# Patient Record
Sex: MALE | Race: BLACK OR AFRICAN AMERICAN | Employment: FULL TIME | URBAN - METROPOLITAN AREA
[De-identification: names, ages, dates, MRNs, and addresses within clinical notes are randomized per-mention and may not be internally consistent; named-entity substitution may affect disease eponyms.]

---

## 2021-09-26 ENCOUNTER — HOSPITAL ENCOUNTER (EMERGENCY)
Age: 41
Discharge: HOME | End: 2021-09-26
Attending: EMERGENCY MEDICINE

## 2021-09-26 ENCOUNTER — APPOINTMENT (OUTPATIENT)
Dept: CT IMAGING | Age: 41
End: 2021-09-26

## 2021-09-26 ENCOUNTER — APPOINTMENT (OUTPATIENT)
Dept: GENERAL RADIOLOGY | Age: 41
End: 2021-09-26

## 2021-09-26 VITALS
DIASTOLIC BLOOD PRESSURE: 64 MMHG | TEMPERATURE: 98.1 F | HEIGHT: 63 IN | SYSTOLIC BLOOD PRESSURE: 122 MMHG | OXYGEN SATURATION: 93 % | HEART RATE: 72 BPM | RESPIRATION RATE: 19 BRPM | WEIGHT: 123 LBS | BODY MASS INDEX: 21.79 KG/M2

## 2021-09-26 DIAGNOSIS — S01.01XA LACERATION OF SCALP, INITIAL ENCOUNTER: Primary | ICD-10-CM

## 2021-09-26 LAB
ABSOLUTE EOS #: 0.22 K/UL (ref 0–0.44)
ABSOLUTE IMMATURE GRANULOCYTE: <0.03 K/UL (ref 0–0.3)
ABSOLUTE LYMPH #: 1.68 K/UL (ref 1.1–3.7)
ABSOLUTE MONO #: 0.47 K/UL (ref 0.1–1.2)
ANION GAP SERPL CALCULATED.3IONS-SCNC: 16 MMOL/L (ref 9–17)
BASOPHILS # BLD: 1 % (ref 0–2)
BASOPHILS ABSOLUTE: 0.07 K/UL (ref 0–0.2)
BUN BLDV-MCNC: 6 MG/DL (ref 6–20)
BUN/CREAT BLD: ABNORMAL (ref 9–20)
CALCIUM SERPL-MCNC: 8.7 MG/DL (ref 8.6–10.4)
CHLORIDE BLD-SCNC: 94 MMOL/L (ref 98–107)
CO2: 25 MMOL/L (ref 20–31)
CREAT SERPL-MCNC: 0.47 MG/DL (ref 0.7–1.2)
DIFFERENTIAL TYPE: ABNORMAL
EOSINOPHILS RELATIVE PERCENT: 4 % (ref 1–4)
GFR AFRICAN AMERICAN: >60 ML/MIN
GFR NON-AFRICAN AMERICAN: >60 ML/MIN
GFR SERPL CREATININE-BSD FRML MDRD: ABNORMAL ML/MIN/{1.73_M2}
GFR SERPL CREATININE-BSD FRML MDRD: ABNORMAL ML/MIN/{1.73_M2}
GLUCOSE BLD-MCNC: 115 MG/DL (ref 70–99)
HCT VFR BLD CALC: 39.1 % (ref 40.7–50.3)
HEMOGLOBIN: 14.5 G/DL (ref 13–17)
IMMATURE GRANULOCYTES: 0 %
LYMPHOCYTES # BLD: 34 % (ref 24–43)
MCH RBC QN AUTO: 34.4 PG (ref 25.2–33.5)
MCHC RBC AUTO-ENTMCNC: 37.1 G/DL (ref 28.4–34.8)
MCV RBC AUTO: 92.9 FL (ref 82.6–102.9)
MONOCYTES # BLD: 10 % (ref 3–12)
MYOGLOBIN: 27 NG/ML (ref 28–72)
NRBC AUTOMATED: 0 PER 100 WBC
PDW BLD-RTO: 14.1 % (ref 11.8–14.4)
PLATELET # BLD: 90 K/UL (ref 138–453)
PLATELET ESTIMATE: ABNORMAL
PMV BLD AUTO: 9.6 FL (ref 8.1–13.5)
POTASSIUM SERPL-SCNC: 3.9 MMOL/L (ref 3.7–5.3)
RBC # BLD: 4.21 M/UL (ref 4.21–5.77)
RBC # BLD: ABNORMAL 10*6/UL
SEG NEUTROPHILS: 51 % (ref 36–65)
SEGMENTED NEUTROPHILS ABSOLUTE COUNT: 2.5 K/UL (ref 1.5–8.1)
SODIUM BLD-SCNC: 135 MMOL/L (ref 135–144)
TOTAL CK: 395 U/L (ref 39–308)
WBC # BLD: 5 K/UL (ref 3.5–11.3)
WBC # BLD: ABNORMAL 10*3/UL

## 2021-09-26 PROCEDURE — 83874 ASSAY OF MYOGLOBIN: CPT

## 2021-09-26 PROCEDURE — 93005 ELECTROCARDIOGRAM TRACING: CPT | Performed by: STUDENT IN AN ORGANIZED HEALTH CARE EDUCATION/TRAINING PROGRAM

## 2021-09-26 PROCEDURE — 96361 HYDRATE IV INFUSION ADD-ON: CPT

## 2021-09-26 PROCEDURE — 80048 BASIC METABOLIC PNL TOTAL CA: CPT

## 2021-09-26 PROCEDURE — 71046 X-RAY EXAM CHEST 2 VIEWS: CPT

## 2021-09-26 PROCEDURE — 6360000002 HC RX W HCPCS: Performed by: STUDENT IN AN ORGANIZED HEALTH CARE EDUCATION/TRAINING PROGRAM

## 2021-09-26 PROCEDURE — 6370000000 HC RX 637 (ALT 250 FOR IP): Performed by: STUDENT IN AN ORGANIZED HEALTH CARE EDUCATION/TRAINING PROGRAM

## 2021-09-26 PROCEDURE — 85025 COMPLETE CBC W/AUTO DIFF WBC: CPT

## 2021-09-26 PROCEDURE — 99284 EMERGENCY DEPT VISIT MOD MDM: CPT

## 2021-09-26 PROCEDURE — 96365 THER/PROPH/DIAG IV INF INIT: CPT

## 2021-09-26 PROCEDURE — 2580000003 HC RX 258: Performed by: STUDENT IN AN ORGANIZED HEALTH CARE EDUCATION/TRAINING PROGRAM

## 2021-09-26 PROCEDURE — 82550 ASSAY OF CK (CPK): CPT

## 2021-09-26 PROCEDURE — 70450 CT HEAD/BRAIN W/O DYE: CPT

## 2021-09-26 PROCEDURE — 12002 RPR S/N/AX/GEN/TRNK2.6-7.5CM: CPT

## 2021-09-26 PROCEDURE — 2500000003 HC RX 250 WO HCPCS: Performed by: STUDENT IN AN ORGANIZED HEALTH CARE EDUCATION/TRAINING PROGRAM

## 2021-09-26 RX ORDER — SODIUM CHLORIDE, SODIUM LACTATE, POTASSIUM CHLORIDE, CALCIUM CHLORIDE 600; 310; 30; 20 MG/100ML; MG/100ML; MG/100ML; MG/100ML
1000 INJECTION, SOLUTION INTRAVENOUS ONCE
Status: COMPLETED | OUTPATIENT
Start: 2021-09-26 | End: 2021-09-26

## 2021-09-26 RX ORDER — ACETAMINOPHEN 500 MG
1000 TABLET ORAL ONCE
Status: COMPLETED | OUTPATIENT
Start: 2021-09-26 | End: 2021-09-26

## 2021-09-26 RX ORDER — ALBUTEROL SULFATE 90 UG/1
2 AEROSOL, METERED RESPIRATORY (INHALATION) EVERY 4 HOURS PRN
Qty: 54 G | Refills: 0 | Status: SHIPPED | OUTPATIENT
Start: 2021-09-26

## 2021-09-26 RX ORDER — ACETAMINOPHEN 500 MG
1000 TABLET ORAL EVERY 6 HOURS PRN
Qty: 60 TABLET | Refills: 0 | Status: SHIPPED | OUTPATIENT
Start: 2021-09-26

## 2021-09-26 RX ADMIN — THIAMINE HYDROCHLORIDE: 100 INJECTION, SOLUTION INTRAMUSCULAR; INTRAVENOUS at 13:46

## 2021-09-26 RX ADMIN — SODIUM CHLORIDE, POTASSIUM CHLORIDE, SODIUM LACTATE AND CALCIUM CHLORIDE 1000 ML: 600; 310; 30; 20 INJECTION, SOLUTION INTRAVENOUS at 12:59

## 2021-09-26 RX ADMIN — ACETAMINOPHEN 1000 MG: 500 TABLET ORAL at 15:15

## 2021-09-26 ASSESSMENT — PAIN SCALES - GENERAL
PAINLEVEL_OUTOF10: 7
PAINLEVEL_OUTOF10: 10

## 2021-09-26 ASSESSMENT — ENCOUNTER SYMPTOMS
SHORTNESS OF BREATH: 0
NAUSEA: 0
SORE THROAT: 0
DIARRHEA: 0
VOMITING: 0
CONSTIPATION: 0
ABDOMINAL PAIN: 0

## 2021-09-26 NOTE — ED PROVIDER NOTES
[facility] ED  Emergency Department Encounter  Emergency Medicine Resident     Pt Name:Waqas Nettles Record  MRN: [id number]  [place] 1980  Date of evaluation: 9/26/21  PCP:  No primary care provider on file. CHIEF COMPLAINT       Chief Complaint   Patient presents with    Fall       HISTORY OF PRESENT ILLNESS  (Location/Symptom, Timing/Onset, Context/Setting, Quality, Duration, Modifying Factors, Severity.)      Joanna Martin is a 39 y.o. male who presents with fall from standing height with head laceration after drinking alcohol last night. Patient is a daily drinker but notes he drank more than normal last night, caught his head on the corner of a table. Patient does not remember specifics given the intoxication. Woke up today with severe headache and bloody head. Patient vigorously cleaned wound including peroxide at home but when he saw how big the wound was thought he should be evaluated. Patient believes the fall was related to the alcohol use. History of asthma, uses inhalers regularly, does not feel short of breath or wheezy. No other concerns or complaints. Denies numbness tingling or weakness. PAST MEDICAL / SURGICAL / SOCIAL / FAMILY HISTORY      has a past medical history of Asthma. has a past surgical history that includes hernia repair and Testicle removal (Right). Social History     Socioeconomic History    Marital status: Single     Spouse name: Not on file    Number of children: Not on file    Years of education: Not on file    Highest education level: Not on file   Occupational History    Not on file   Tobacco Use    Smoking status: Current Every Day Smoker   Substance and Sexual Activity    Alcohol use:  Yes    Drug use: No    Sexual activity: Not on file   Other Topics Concern    Not on file   Social History Narrative    Not on file     Social Determinants of Health     Financial Resource Strain:     Difficulty of Paying Living Expenses: Food Insecurity:     Worried About Running Out of Food in the Last Year:     [address] in the Last Year:    Transportation Needs:     Lack of Transportation (Medical):  Lack of Transportation (Non-Medical):    Physical Activity:     Days of Exercise per Week:     Minutes of Exercise per Session:    Stress:     Feeling of Stress :    Social Connections:     Frequency of Communication with Friends and Family:     Frequency of Social Gatherings with Friends and Family:     Attends Bahai Services:     Active Member of Clubs or Organizations:     Attends Club or Organization Meetings:     Marital Status:    Intimate Partner Violence:     Fear of Current or Ex-Partner:     Emotionally Abused:     Physically Abused:     Sexually Abused:        No family history on file. Allergies:  Patient has no known allergies. Home Medications:  Prior to Admission medications    Medication Sig Start Date End Date Taking? Authorizing Provider   acetaminophen (TYLENOL) 500 MG tablet Take 2 tablets by mouth every 6 hours as needed for Pain 9/26/21  Yes Bree Floyd MD   albuterol sulfate HFA (VENTOLIN HFA) 108 (90 Base) MCG/ACT inhaler Inhale 2 puffs into the lungs every 4 hours as needed for Wheezing or Shortness of Breath 9/26/21  Yes Bree Floyd MD   ibuprofen (ADVIL;MOTRIN) 800 MG tablet Take 1 tablet by mouth every 8 hours as needed for Pain 9/24/16   Josiah Meyer PA-C   naproxen (NAPROSYN) 500 MG tablet Take 1 tablet by mouth 2 times daily (with meals). 9/10/14   Flor Nielsen MD   albuterol (PROVENTIL) (2.5 MG/3ML) 0.083% nebulizer solution Take 2.5 mg by nebulization every 4 hours as needed for Wheezing. Historical Provider, MD   ALBUTEROL SULFATE IN Inhale 2 puffs into the lungs 4 times daily as needed. Historical Provider, MD       REVIEW OF SYSTEMS    (2-9 systems for level 4, 10 or more for level 5)      Review of Systems   Constitutional: Negative for fever.    HENT: Negative for sore throat. Eyes: Negative for visual disturbance. Respiratory: Negative for shortness of breath. Cardiovascular: Negative for chest pain. Gastrointestinal: Negative for abdominal pain, constipation, diarrhea, nausea and vomiting. Genitourinary: Negative for decreased urine volume. Musculoskeletal: Negative for arthralgias and myalgias. Skin: Positive for wound. Neurological: Positive for headaches. Negative for weakness and light-headedness. Psychiatric/Behavioral: Negative for confusion. PHYSICAL EXAM   (up to 7 for level 4, 8 or more for level 5)      INITIAL VITALS:   /64   Pulse 72   Temp 98.1 °F (36.7 °C) (Oral)   Resp 19   Ht 5' 3\" (1.6 m)   Wt 123 lb (55.8 kg)   SpO2 93%   BMI 21.79 kg/m²     Physical Exam  Vitals and nursing note reviewed. Constitutional:       Appearance: Normal appearance. He is well-developed. HENT:      Head: Normocephalic. Comments: 3 cm scalp laceration just right of midline. Mild right eyebrow swelling without laceration     Right Ear: External ear normal.      Left Ear: External ear normal.      Nose: Nose normal.      Mouth/Throat:      Mouth: Mucous membranes are moist.   Eyes:      General:         Right eye: No discharge. Left eye: No discharge. Extraocular Movements: Extraocular movements intact. Pupils: Pupils are equal, round, and reactive to light. Neck:      Trachea: Trachea normal. No tracheal deviation. Cardiovascular:      Rate and Rhythm: Normal rate and regular rhythm. Pulmonary:      Effort: Pulmonary effort is normal. No respiratory distress. Abdominal:      Palpations: Abdomen is soft. Tenderness: There is no abdominal tenderness. Musculoskeletal:         General: No deformity. Normal range of motion. Cervical back: Normal range of motion. Skin:     General: Skin is warm and dry. Capillary Refill: Capillary refill takes less than 2 seconds.    Neurological: General: No focal deficit present. Mental Status: He is alert and oriented to person, place, and time. Cranial Nerves: No cranial nerve deficit. Sensory: No sensory deficit. Motor: No weakness.       Coordination: Coordination normal.   Psychiatric:         Mood and Affect: Mood normal.         Behavior: Behavior normal.           DIFFERENTIAL  DIAGNOSIS     PLAN (LABS / IMAGING / EKG):  Orders Placed This Encounter   Procedures    LACERATION REPAIR    XR CHEST (2 VW)    CT Head WO Contrast    CBC Auto Differential    Basic Metabolic Panel w/ Reflex to MG    CK    MYOGLOBIN, SERUM    EKG 12 Lead    Insert peripheral IV       MEDICATIONS ORDERED:  Orders Placed This Encounter   Medications    lactated ringers infusion 4,840 mL    folic acid 1 mg, thiamine (B-1) 100 mg in dextrose 5 % 50 mL IVPB    acetaminophen (TYLENOL) 500 MG tablet     Sig: Take 2 tablets by mouth every 6 hours as needed for Pain     Dispense:  60 tablet     Refill:  0    acetaminophen (TYLENOL) tablet 1,000 mg    albuterol sulfate HFA (VENTOLIN HFA) 108 (90 Base) MCG/ACT inhaler     Sig: Inhale 2 puffs into the lungs every 4 hours as needed for Wheezing or Shortness of Breath     Dispense:  54 g     Refill:  0       DDX: Laceration from alcohol induced syncope versus cardiogenic syncope versus other    DIAGNOSTIC RESULTS / EMERGENCY DEPARTMENT COURSE / MDM     LABS:  Results for orders placed or performed during the hospital encounter of 09/26/21   CBC Auto Differential   Result Value Ref Range    WBC 5.0 3.5 - 11.3 k/uL    RBC 4.21 4.21 - 5.77 m/uL    Hemoglobin 14.5 13.0 - 17.0 g/dL    Hematocrit 39.1 (L) 40.7 - 50.3 %    MCV 92.9 82.6 - 102.9 fL    MCH 34.4 (H) 25.2 - 33.5 pg    MCHC 37.1 (H) 28.4 - 34.8 g/dL    RDW 14.1 11.8 - 14.4 %    Platelets 90 (L) 099 - 453 k/uL    MPV 9.6 8.1 - 13.5 fL    NRBC Automated 0.0 0.0 per 100 WBC    Differential Type NOT REPORTED     Seg Neutrophils 51 36 - 65 % reduce the radiation dose to as low as reasonably achievable. COMPARISON: None. HISTORY: ORDERING SYSTEM PROVIDED HISTORY: fall w/ head lac, R eyelid lac TECHNOLOGIST PROVIDED HISTORY: fall w/ head lac, R eyelid lac Decision Support Exception - unselect if not a suspected or confirmed emergency medical condition->Emergency Medical Condition (MA) Reason for Exam: fall with head laceration rt eyelid lac FINDINGS: BRAIN/VENTRICLES: There is no acute intracranial hemorrhage, mass effect or midline shift. No abnormal extra-axial fluid collection. The gray-white differentiation is maintained without evidence of an acute infarct. There is no evidence of hydrocephalus. ORBITS: The visualized portion of the orbits demonstrate no acute abnormality. SINUSES: The visualized paranasal sinuses and mastoid air cells demonstrate no acute abnormality. SOFT TISSUES/SKULL: Soft tissue swelling at the right frontal convexity. Small polyp or mucous retention cyst in the left maxillary sinus. No acute intracranial abnormality. EKG  EKG Interpretation    Interpreted by me    Rhythm: normal sinus   Rate: normal  Axis: normal  Ectopy: none  Conduction: normal  ST Segments: no acute change  T Waves: no acute change  Q Waves: none    Clinical Impression: Nonspecific EKG without significant change when compared with EKG dated 9/10/2014    All EKG's are interpreted by the Emergency Department Physician who either signs or Co-signs this chart in the absence of a cardiologist.    EMERGENCY DEPARTMENT COURSE:  Patient found seated upright in bed, no acute distress, not ill or toxic appearing. Engaged in cooperative exam.  Physical exam notable for scalp laceration, bleeding controlled. Stable vitals. Normal neuro exam, no C-spine tenderness. No other signs of injury or trauma. Patient is not anticoagulated. Head CT unremarkable. EKG unchanged from prior. Prominent T waves with similar appearance to prior EKG.   Unremarkable laboratory work-up, no signs of rhabdo. Patient fluid bolused, acetaminophen for analgesics. Patient is alert and oriented, tolerated repair without difficulty. Will follow up in 7 days for staple removal.  PCP referral provided. Discharge plan discussed with patient who is in agreement. Educated on likely pathology, medications, return precautions, and follow-up. Patient understood all educated materials with all questions answered to their satisfaction. PROCEDURES:  Lac Repair    Date/Time: 9/26/2021 3:21 PM  Performed by: Patsy Benitez MD  Authorized by: Emiliana Blackwood MD     Consent:     Consent obtained:  Verbal    Consent given by:  Patient    Risks discussed:  Infection, pain, poor cosmetic result, need for additional repair and poor wound healing  Anesthesia (see MAR for exact dosages): Anesthesia method:  None  Laceration details:     Location:  Scalp    Scalp location:  Frontal    Length (cm):  3    Depth (mm):  3  Repair type:     Repair type:  Simple  Exploration:     Hemostasis achieved with:  Direct pressure    Contaminated: no    Treatment:     Area cleansed with:  Soap and water    Amount of cleaning:  Standard  Skin repair:     Repair method:  Staples    Number of staples:  5  Approximation:     Approximation:  Close  Post-procedure details:     Dressing:  Open (no dressing)    Patient tolerance of procedure: Tolerated well, no immediate complications        CONSULTS:  None    CRITICAL CARE:  None    FINAL IMPRESSION      1.  Laceration of scalp, initial encounter          DISPOSITION / PLAN     DISPOSITION        PATIENT REFERRED TO:  Geri Galvin [address] [address] [phone]  Schedule an appointment as soon as possible for a visit   To establish care, Regarding this visit    [facility]  [facility]  [phone]  Go to   If symptoms worsen      DISCHARGE MEDICATIONS:  Discharge Medication List as of 9/26/2021 3:05 PM      START taking these medications    Details   acetaminophen (TYLENOL) 500 MG tablet Take 2 tablets by mouth every 6 hours as needed for Pain, Disp-60 tablet, R-0Print      albuterol sulfate HFA (VENTOLIN HFA) 108 (90 Base) MCG/ACT inhaler Inhale 2 puffs into the lungs every 4 hours as needed for Wheezing or Shortness of Breath, Disp-54 g, R-0Print             Javier Hammond MD  Emergency Medicine Resident    (Please note that portions of this note were completed with a voice recognition program.  Efforts were made to edit the dictations but occasionally words are mis-transcribed.)        Javier Hammond MD  Resident  09/26/21 5153

## 2021-09-26 NOTE — ED PROVIDER NOTES
[facility]  Emergency Department  Faculty Attestation     I performed a history and physical examination of the patient and discussed management with the resident. I reviewed the residents note and agree with the documented findings and plan of care. Any areas of disagreement are noted on the chart. I was personally present for the key portions of any procedures. I have documented in the chart those procedures where I was not present during the key portions. I have reviewed the emergency nurses triage note. I agree with the chief complaint, past medical history, past surgical history, allergies, medications, social and family history as documented unless otherwise noted below. For Physician Assistant/ Nurse Practitioner cases/documentation I have personally evaluated this patient and have completed at least one if not all key elements of the E/M (history, physical exam, and MDM). Additional findings are as noted. Primary Care Physician:  No primary care provider on file. Screenings:  [unfilled]    CHIEF COMPLAINT       Chief Complaint   Patient presents with    Fall       RECENT VITALS:   Temp: 98.1 °F (36.7 °C),  Pulse: 89, Resp: 18, BP: (!) 153/92    LABS:  Labs Reviewed - No data to display    Radiology  No orders to display         EKG:   EKG Interpretation    Interpreted by me    Rhythm: normal sinus   Rate: normal  Axis: normal  Ectopy: none  Conduction: normal  ST Segments: ST elevation V14, no reciprocal changes, J wave in V2 with S waves in V2, 3  T Waves: no acute change  Q Waves: none    Clinical Impression: Early repolarization    Attending Physician Additional  Notes    Patient has a head injury with headache. He was drinking recently and passed out. He has a laceration to the top of his forehead with a significant headache since then. No strokelike symptoms. No neck pain. No nausea vomiting. No Covid symptoms. No Covid vaccination.   No difficulty breathing. He does not believe he is on blood thinners but he states he had a stroke several months ago. On exam GCS is 15, vital signs reveal hypertension otherwise normal.  Saturations are 92% with good waveform and no wrist or distress. Neck is supple nontender midline and full range of movement. There is a 3 cm linear laceration on the top of his forehead and the scalp. No obvious foreign body. Bleeding is controlled. No extremity deformity. Normal motor strength. Impression is intoxication, syncope, head injury, scalp laceration, low normal oxygen saturation. Plan is CT brain, chest x-ray, labs, fluids, wound care with staples, reassess. Unique Mcwilliams.  Brooklyn Buenrostro MD, [facility]  Attending Emergency  Physician               Jackie Coulter MD  09/26/21 [id number]

## 2021-09-26 NOTE — ED NOTES
Pt presents to the ED with C/O falling last night. Pt stated he was drinking last night and fell and hit his head. Pt did his head and he was told he is + LOC. Pt states he drinks everyday. Pt stated he woke up to EMS. EMS wanted to take him to ED but he refused. Hx of stroke a few months a go. Pt on full cardiac monitor. Will continue to monitor and reassess.       Ofelia Aguila RN  09/26/21 [id number]       Ofelia Aguila RN  09/26/21 [id number]

## 2021-09-27 LAB
EKG ATRIAL RATE: 92 BPM
EKG P AXIS: 82 DEGREES
EKG P-R INTERVAL: 158 MS
EKG Q-T INTERVAL: 378 MS
EKG QRS DURATION: 98 MS
EKG QTC CALCULATION (BAZETT): 467 MS
EKG R AXIS: 69 DEGREES
EKG T AXIS: 66 DEGREES
EKG VENTRICULAR RATE: 92 BPM

## 2021-09-27 PROCEDURE — 93010 ELECTROCARDIOGRAM REPORT: CPT | Performed by: INTERNAL MEDICINE

## 2021-10-18 ENCOUNTER — HOSPITAL ENCOUNTER (EMERGENCY)
Age: 41
Discharge: HOME | End: 2021-10-18
Attending: EMERGENCY MEDICINE

## 2021-10-18 VITALS
TEMPERATURE: 98.4 F | OXYGEN SATURATION: 99 % | SYSTOLIC BLOOD PRESSURE: 152 MMHG | HEIGHT: 63 IN | HEART RATE: 93 BPM | DIASTOLIC BLOOD PRESSURE: 96 MMHG | RESPIRATION RATE: 16 BRPM | WEIGHT: 120 LBS | BODY MASS INDEX: 21.26 KG/M2

## 2021-10-18 DIAGNOSIS — Z48.02 ENCOUNTER FOR STAPLE REMOVAL: Primary | ICD-10-CM

## 2021-10-18 PROCEDURE — 99282 EMERGENCY DEPT VISIT SF MDM: CPT

## 2021-10-18 ASSESSMENT — ENCOUNTER SYMPTOMS
ABDOMINAL PAIN: 0
SHORTNESS OF BREATH: 0

## 2021-10-18 NOTE — ED PROVIDER NOTES
[facility]  ED  Emergency Department Encounter  Emergency Medicine Resident     Pt Name: Hero Nixon  MRN: [id number]  [place] 1980  Date of evaluation: 10/18/21  PCP:  No primary care provider on file. CHIEF COMPLAINT       Chief Complaint   Patient presents with    Suture / Staple Removal     Staples in head that needed removed, were placed 2 weeks ago. Pt reports 5 staples are in place       HISTORY OFPRESENT ILLNESS  (Location/Symptom, Timing/Onset, Context/Setting, Quality, Duration, Modifying Factors,Severity.)      Hero Nixon is a 39 y.o. male with past medical history of asthma who presents for staple removal. Patient had staples originally placed 9/26/2021. No concerns or complaints at this time. PAST MEDICAL / SURGICAL / SOCIAL / FAMILY HISTORY      has a past medical history of Asthma. has a past surgical history that includes hernia repair and Testicle removal (Right). Social History     Socioeconomic History    Marital status: Single     Spouse name: Not on file    Number of children: Not on file    Years of education: Not on file    Highest education level: Not on file   Occupational History    Not on file   Tobacco Use    Smoking status: Current Every Day Smoker   Substance and Sexual Activity    Alcohol use: Yes    Drug use: No    Sexual activity: Not on file   Other Topics Concern    Not on file   Social History Narrative    Not on file     Social Determinants of Health     Financial Resource Strain:     Difficulty of Paying Living Expenses:    Food Insecurity:     Worried About Running Out of Food in the Last Year:     [address] in the Last Year:    Transportation Needs:     Lack of Transportation (Medical):      Lack of Transportation (Non-Medical):    Physical Activity:     Days of Exercise per Week:     Minutes of Exercise per Session:    Stress:     Feeling of Stress :    Social Connections:     Frequency of Communication with Friends and Family:     Frequency of Social Gatherings with Friends and Family:     Attends Latter day Services:     Active Member of Clubs or Organizations:     Attends Club or Organization Meetings:     Marital Status:    Intimate Partner Violence:     Fear of Current or Ex-Partner:     Emotionally Abused:     Physically Abused:     Sexually Abused:        History reviewed. No pertinent family history. Allergies:  Patient has no known allergies. Home Medications:  Prior to Admission medications    Medication Sig Start Date End Date Taking? Authorizing Provider   acetaminophen (TYLENOL) 500 MG tablet Take 2 tablets by mouth every 6 hours as needed for Pain 9/26/21   Jovita Ding MD   albuterol sulfate HFA (VENTOLIN HFA) 108 (90 Base) MCG/ACT inhaler Inhale 2 puffs into the lungs every 4 hours as needed for Wheezing or Shortness of Breath 9/26/21   Jovita Ding MD   ibuprofen (ADVIL;MOTRIN) 800 MG tablet Take 1 tablet by mouth every 8 hours as needed for Pain 9/24/16   Ida Montejo PA-C   naproxen (NAPROSYN) 500 MG tablet Take 1 tablet by mouth 2 times daily (with meals). 9/10/14   Rojelio Nielsen MD   albuterol (PROVENTIL) (2.5 MG/3ML) 0.083% nebulizer solution Take 2.5 mg by nebulization every 4 hours as needed for Wheezing. Historical Provider, MD   ALBUTEROL SULFATE IN Inhale 2 puffs into the lungs 4 times daily as needed. Historical Provider, MD       REVIEW OF SYSTEMS    (2-9 systems for level 4, 10 or more for level 5)      Review of Systems   Constitutional: Negative for fever. Respiratory: Negative for shortness of breath. Cardiovascular: Negative for chest pain. Gastrointestinal: Negative for abdominal pain. Musculoskeletal: Negative for myalgias. Skin: Positive for wound. Neurological: Negative for headaches. PHYSICAL EXAM   (up to 7 for level 4, 8 or more for level 5)     INITIAL VITALS:    height is 5' 3\" (1.6 m) and weight is 120 lb (54.4 kg).  His skin temperature is 98.4 °F (36.9 °C). His blood pressure is 152/96 (abnormal) and his pulse is 93. His respiration is 16 and oxygen saturation is 99%. Physical Exam  Constitutional:       General: He is not in acute distress. Appearance: Normal appearance. He is not ill-appearing or toxic-appearing. HENT:      Head:      Comments: Healing laceration with five staples in place present top of patient's head no surrounding signs of infection     Nose: Nose normal.   Pulmonary:      Effort: Pulmonary effort is normal. No respiratory distress. Skin:     General: Skin is warm and dry. Capillary Refill: Capillary refill takes less than 2 seconds. Findings: No rash. Neurological:      Mental Status: He is alert. Psychiatric:         Mood and Affect: Mood normal.         Behavior: Behavior normal.           DIFFERENTIAL  DIAGNOSIS     PLAN (LABS / IMAGING / EKG):  No orders of the defined types were placed in this encounter. MEDICATIONS ORDERED:  No orders of the defined types were placed in this encounter. DDX: Staple removal    Initial MDM/Plan: 39 y.o. male who presents for staple removal. Placed on 9/26/2021. Seen and examined. No acute distress. Vitals are normal. Physical exam benign. Will remove staples and discharge home. DIAGNOSTIC RESULTS / EMERGENCY DEPARTMENT COURSE / MDM     LABS:  Labs Reviewed - No data to display      RADIOLOGY:  No results found. EMERGENCY DEPARTMENT COURSE:     Staples removed, patient discharged. PROCEDURES:  Suture Removal    Date/Time: 10/18/2021 11:57 AM  Performed by: Mally Scott DO  Authorized by:  Nitesh Stevenson MD     Consent:     Consent obtained:  Verbal    Consent given by:  Patient    Risks discussed:  Bleeding and wound separation    Alternatives discussed:  No treatment  Location:     Location:  Head/neck    Head/neck location:  Scalp  Procedure details:     Wound appearance:  No signs of infection, good wound healing and clean    Number of staples removed:  5  Post-procedure details:     Post-removal:  No dressing applied    Patient tolerance of procedure: Tolerated well, no immediate complications        CONSULTS:  None    CRITICAL CARE:  Please see attending note    FINAL IMPRESSION      1.  Encounter for staple removal          DISPOSITION / PLAN     DISPOSITION Decision To Discharge 10/18/2021 11:12:17 AM        PATIENTREFERRED TO:  [address] [place] [id number] [phone]  Schedule an appointment as soon as possible for a visit   establish PCP      DISCHARGE MEDICATIONS:  Discharge Medication List as of 10/18/2021 11:41 AM          Maxx Davalos DO  EmergencyMedicine Resident    (Please note that portions of this note were completed with a voice recognition program.  Efforts were made to edit the dictations but occasionally words are mis-transcribed.)        Maxx Davalos DO  Resident  10/18/21 [id number]

## 2021-10-18 NOTE — ED PROVIDER NOTES
[facility] [address] [facility]     Emergency Department     Faculty Attestation        I performed a history and physical examination of the patient and discussed management with the resident. I reviewed the residents note and agree with the documented findings and plan of care. Any areas of disagreement are noted on the chart. I was personally present for the key portions of any procedures. I have documented in the chart those procedures where I was not present during the key portions. I have reviewed the emergency nurses triage note. I agree with the chief complaint, past medical history, past surgical history, allergies, medications, social and family history as documented unless otherwise noted below. For Physician Assistant/ Nurse Practitioner cases/documentation I have personally evaluated this patient and have completed at least one if not all key elements of the E/M (history, physical exam, and MDM). Additional findings are as noted. Vital Signs: BP: (!) 152/96  Pulse: 93  Resp: 16  Temp: 98.4 °F (36.9 °C) SpO2: 99 %  PCP:  No primary care provider on file. Pertinent Comments:     Patient presents for suture removal.    5 staples in the anterior right head/scalp. Have been in place for 3 weeks  Physical examination:  The laceration area appears to be clean/dry/intact and healing well with no obvious signs of infection such as erythema, warmth, or drainage. Plan: Will remove staples                  Critical Care  None    This patient was evaluated in the Emergency Department for symptoms described in the history of present illness. He/she was evaluated in the context of the global COVID-19 pandemic, which necessitated consideration that the patient might be at risk for infection with the SARS-CoV-2 virus that causes COVID-19.  Institutional protocols and algorithms that pertain to the evaluation of patients at risk for COVID-19 are in a state of rapid change based on information released by regulatory bodies including the CDC and federal and state organizations. These policies and algorithms were followed during the patient's care in the ED. (Please note that portions of this note were completed with a voice recognition program. Efforts were made to edit the dictations but occasionally words are mis-transcribed.  Whenever words are used in this note in any gender, they shall be construed as though they were used in the gender appropriate to the circumstances; and whenever words are used in this note in the singular or plural form, they shall be construed as though they were used in the form appropriate to the circumstances.)    MD Lelo Velez  Attending Emergency Medicine Physician           Andrew Mejia MD  10/18/21 3033

## 2021-10-18 NOTE — ED NOTES
This patient was assessed by the doctor only. Nurse processed and completed the orders from this doctor ie labs, meds, and/or EKG.         Cy Turner RN  10/18/21 7978

## 2021-10-18 NOTE — ED NOTES
Bed: 38  Expected date:   Expected time:   Means of arrival:   Comments:     Chris Monahan RN  10/18/21 3386 19.29

## 2021-10-30 ENCOUNTER — HOSPITAL ENCOUNTER (EMERGENCY)
Age: 41
Discharge: HOME | End: 2021-10-31
Attending: EMERGENCY MEDICINE

## 2021-10-30 ENCOUNTER — APPOINTMENT (OUTPATIENT)
Dept: GENERAL RADIOLOGY | Age: 41
End: 2021-10-30

## 2021-10-30 DIAGNOSIS — M25.512 ACUTE PAIN OF LEFT SHOULDER: Primary | ICD-10-CM

## 2021-10-30 LAB
ETHANOL PERCENT: 0.36 %
ETHANOL: 365 MG/DL

## 2021-10-30 PROCEDURE — 99285 EMERGENCY DEPT VISIT HI MDM: CPT

## 2021-10-30 PROCEDURE — G0480 DRUG TEST DEF 1-7 CLASSES: HCPCS

## 2021-10-30 PROCEDURE — 73030 X-RAY EXAM OF SHOULDER: CPT

## 2021-10-30 ASSESSMENT — PAIN DESCRIPTION - FREQUENCY: FREQUENCY: CONTINUOUS

## 2021-10-30 ASSESSMENT — PAIN SCALES - GENERAL: PAINLEVEL_OUTOF10: 7

## 2021-10-30 ASSESSMENT — PAIN DESCRIPTION - LOCATION: LOCATION: SHOULDER

## 2021-10-30 ASSESSMENT — PAIN DESCRIPTION - ORIENTATION: ORIENTATION: LEFT

## 2021-10-30 ASSESSMENT — PAIN DESCRIPTION - PAIN TYPE: TYPE: ACUTE PAIN

## 2021-10-31 VITALS
BODY MASS INDEX: 21.26 KG/M2 | DIASTOLIC BLOOD PRESSURE: 90 MMHG | TEMPERATURE: 96.8 F | OXYGEN SATURATION: 97 % | WEIGHT: 120 LBS | HEART RATE: 93 BPM | RESPIRATION RATE: 18 BRPM | HEIGHT: 63 IN | SYSTOLIC BLOOD PRESSURE: 145 MMHG

## 2021-10-31 ASSESSMENT — ENCOUNTER SYMPTOMS
COUGH: 0
VOMITING: 0
NAUSEA: 0
SHORTNESS OF BREATH: 0
BACK PAIN: 0
FACIAL SWELLING: 0
ABDOMINAL DISTENTION: 0
ABDOMINAL PAIN: 0
CHOKING: 0
CHEST TIGHTNESS: 0
TROUBLE SWALLOWING: 0

## 2021-10-31 NOTE — ED NOTES
Pt provided box lunch tray as requested.   Respirations equal and unlabored with skin BWD and no signs or symptoms of acute distress     Marge Yoo RN  10/31/21 0015

## 2021-10-31 NOTE — ED NOTES
Pt awake and ambulated the restroom with steady gait.    Respirations equal and unlabored with skin BWD and no signs or symptoms of acute distress     Teodora Parada RN  10/31/21 6543

## 2021-10-31 NOTE — ED PROVIDER NOTES
Faculty Sign-Out Attestation  Handoff taken on the following patient from prior Attending Physician: Mary Anne Preciado    I was available and discussed any additional care issues that arose and coordinated the management plans with the resident(s) caring for the patient during my duty period. Any areas of disagreement with residents documentation of care or procedures are noted on the chart. I was personally present for the key portions of any/all procedures during my duty period. I have documented in the chart those procedures where I was not present during the key portions. XR SHOULDER LEFT (MIN 2 VIEWS)   Final Result   Findings of chronic calcific rotator cuff tendinopathy. Otherwise,   unremarkable left shoulder. No acute fracture or dislocation.                   Magalys Amin MD  Attending Physician       Magalys Amin MD  10/31/21 4144

## 2021-10-31 NOTE — ED PROVIDER NOTES
[address]  ED  Emergency Department  Emergency Medicine Resident Sign-out     Care of Hero Nixon was assumed from Dr. Vasquez Walden and is being seen for Shoulder Pain  . The patient's initial evaluation and plan have been discussed with the prior provider who initially evaluated the patient. EMERGENCY DEPARTMENT COURSE / MEDICAL DECISION MAKING:       MEDICATIONS GIVEN:  No orders of the defined types were placed in this encounter. LABS / RADIOLOGY:     Labs Reviewed   ETHANOL - Abnormal; Notable for the following components:       Result Value    Ethanol 365 (*)     Ethanol percent 0.365 (*)     All other components within normal limits       XR SHOULDER LEFT (MIN 2 VIEWS)    Result Date: 10/30/2021  EXAMINATION: 3 XRAY VIEWS OF THE LEFT SHOULDER 10/30/2021 10:05 pm COMPARISON: None. HISTORY: ORDERING SYSTEM PROVIDED HISTORY: fall a week ago, persistent pain, intoxicated. TECHNOLOGIST PROVIDED HISTORY: fall a week ago, persistent pain, intoxicated. Reason for Exam: fall FINDINGS: Three views of the left shoulder were obtained. Bone mineralization is normal.  There is no acute fracture or dislocation. Joint relationships are maintained. No significant degenerative findings. A linear soft tissue calcification within the rotator cuff interval measures 1.1 cm in length. The visualized left lung is clear. Findings of chronic calcific rotator cuff tendinopathy. Otherwise, unremarkable left shoulder. No acute fracture or dislocation. RECENT VITALS:     Temp: 96.8 °F (36 °C),  Pulse: 93, Resp: 18, BP: (!) 145/90, SpO2: 97 %    This patient is a 39 y.o. Male with shoulder pain, fall 1 week ago. Negative imaging. Patient intoxicated on arrival.  Will reevaluate at sober time, likely discharge    Attempted to reevaluate patient however he is no longer in the room. Patient likely eloped. OUTSTANDING TASKS / RECOMMENDATIONS:    1. Eval when sober     FINAL IMPRESSION:     1. Acute pain of left shoulder        DISPOSITION:         DISPOSITION:  []  Discharge   []  Transfer -    []  Admission -     []  Against Medical Advice   []  Eloped   FOLLOW-UP: No follow-up provider specified.    DISCHARGE MEDICATIONS: Discharge Medication List as of 10/31/2021  9:26 AM             Alvina Tillman DO  Emergency Medicine Resident  [facility]        Alvina Tillman [place]  Resident  10/31/21 1234

## 2021-10-31 NOTE — ED NOTES
Pt lyingon cot with eyes closed and respirations equal and unlabored.   Skin BWD and no signs or symptoms of acute distress     Melinda Gomez RN  10/31/21 6612
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
## 2021-10-31 NOTE — ED TRIAGE NOTES
Pt states that he fell a couple weeks ago on his left shoulder and tonight the pain was too much and wanted to get it checked out.   Pain is reproducible on palpation to the area

## 2021-10-31 NOTE — ED PROVIDER NOTES
[address]   Emergency Department  Emergency Medicine Attending Sign-out     Care of Lin Martínez was assumed from previous attending Dr. Jenny Guevara and is being seen for Shoulder Pain  . The patient's initial evaluation and plan have been discussed with the prior provider who initially evaluated the patient. Attestation  I was available and discussed any additional care issues that arose and coordinated the management plans with the resident(s) caring for the patient during my duty period. Any areas of disagreement with resident's documentation of care or procedures are noted on the chart. I was personally present for the key portions of any/all procedures, during my duty period. I have documented in the chart those procedures where I was not present during the key portions. BRIEF PATIENT SUMMARY/MDM COURSE PER INITIAL PROVIDER:   RECENT VITALS:      ,  Pulse: 93, Resp: 18, BP: (!) 145/90, SpO2: 97 %    This patient is a 39 y.o. Male with alcohol intoxication, and reported old fall now complaint of left shoulder pain. However unclear as patient is currently intoxicated. Awaiting clinical sobriety    DIAGNOSTICS/MEDICATIONS:     MEDICATIONS GIVEN:  ED Medication Orders (From admission, onward)    None          LABS    Labs Reviewed   ETHANOL - Abnormal; Notable for the following components:       Result Value    Ethanol 365 (*)     Ethanol percent 0.365 (*)     All other components within normal limits       RADIOLOGY  XR SHOULDER LEFT (MIN 2 VIEWS)    Result Date: 10/30/2021  EXAMINATION: 3 XRAY VIEWS OF THE LEFT SHOULDER 10/30/2021 10:05 pm COMPARISON: None. HISTORY: ORDERING SYSTEM PROVIDED HISTORY: fall a week ago, persistent pain, intoxicated. TECHNOLOGIST PROVIDED HISTORY: fall a week ago, persistent pain, intoxicated. Reason for Exam: fall FINDINGS: Three views of the left shoulder were obtained. Bone mineralization is normal.  There is no acute fracture or dislocation. Joint relationships are maintained. No significant degenerative findings. A linear soft tissue calcification within the rotator cuff interval measures 1.1 cm in length. The visualized left lung is clear. Findings of chronic calcific rotator cuff tendinopathy. Otherwise, unremarkable left shoulder. No acute fracture or dislocation. OUTSTANDING TASKS / ADDITIONAL COMMENTS   1.  Clinical sobriety    Kamla Saba MD  Emergency Medicine Attending  [id number] MD  10/31/21 8237

## 2021-10-31 NOTE — ED NOTES
Dr. Alcides Francois at bedside to re-evaluate pt. Pt not in treatment area at this time.       Francie Odonnell RN  10/31/21 0896
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
## 2022-02-21 ENCOUNTER — HOSPITAL ENCOUNTER (EMERGENCY)
Age: 42
Discharge: HOME | End: 2022-02-21
Attending: EMERGENCY MEDICINE

## 2022-02-21 VITALS
DIASTOLIC BLOOD PRESSURE: 91 MMHG | HEART RATE: 89 BPM | WEIGHT: 120 LBS | OXYGEN SATURATION: 97 % | TEMPERATURE: 98.2 F | BODY MASS INDEX: 21.26 KG/M2 | RESPIRATION RATE: 18 BRPM | SYSTOLIC BLOOD PRESSURE: 125 MMHG

## 2022-02-21 DIAGNOSIS — S05.8X1A SUPERFICIAL INJURY OF RIGHT CORNEA, INITIAL ENCOUNTER: ICD-10-CM

## 2022-02-21 DIAGNOSIS — S05.8X2A SUPERFICIAL INJURY OF LEFT CORNEA, INITIAL ENCOUNTER: Primary | ICD-10-CM

## 2022-02-21 PROCEDURE — 99284 EMERGENCY DEPT VISIT MOD MDM: CPT

## 2022-02-21 PROCEDURE — 2500000003 HC RX 250 WO HCPCS: Performed by: STUDENT IN AN ORGANIZED HEALTH CARE EDUCATION/TRAINING PROGRAM

## 2022-02-21 PROCEDURE — 6370000000 HC RX 637 (ALT 250 FOR IP): Performed by: STUDENT IN AN ORGANIZED HEALTH CARE EDUCATION/TRAINING PROGRAM

## 2022-02-21 RX ORDER — KETOROLAC TROMETHAMINE 5 MG/ML
1 SOLUTION OPHTHALMIC 4 TIMES DAILY
Qty: 3 ML | Refills: 0 | Status: SHIPPED | OUTPATIENT
Start: 2022-02-21 | End: 2022-02-28

## 2022-02-21 RX ORDER — CIPROFLOXACIN HYDROCHLORIDE 3.5 MG/ML
2 SOLUTION/ DROPS TOPICAL 2 TIMES DAILY
Qty: 10 ML | Refills: 0 | Status: SHIPPED | OUTPATIENT
Start: 2022-02-21 | End: 2022-02-25

## 2022-02-21 RX ORDER — PROPARACAINE HYDROCHLORIDE 5 MG/ML
1 SOLUTION/ DROPS OPHTHALMIC ONCE
Status: COMPLETED | OUTPATIENT
Start: 2022-02-21 | End: 2022-02-21

## 2022-02-21 RX ADMIN — FLUORESCEIN SODIUM 1 MG: 1 STRIP OPHTHALMIC at 10:33

## 2022-02-21 RX ADMIN — PROPARACAINE HYDROCHLORIDE 1 DROP: 5 SOLUTION/ DROPS OPHTHALMIC at 10:33

## 2022-02-21 ASSESSMENT — ENCOUNTER SYMPTOMS
VOMITING: 0
EYE ITCHING: 1
EYE REDNESS: 1
CONSTIPATION: 0
DIARRHEA: 0
EYE DISCHARGE: 0
ABDOMINAL DISTENTION: 0
RESPIRATORY NEGATIVE: 1
PHOTOPHOBIA: 0
BACK PAIN: 0
EYE PAIN: 1

## 2022-02-21 NOTE — ED NOTES
Pt tested with acuity chart in both eye.  Pt was 20/100 in both eyes     Mel[facility]  02/21/22 7626

## 2022-02-21 NOTE — Clinical Note
Humberto Perdomo was seen and treated in our emergency department on 2/21/2022. He may return to work on 02/23/2022. If you have any questions or concerns, please don't hesitate to call.       Kavon Fernandes MD

## 2022-02-21 NOTE — ED PROVIDER NOTES
[facility] ED     Emergency Department     Faculty Attestation        I performed a history and physical examination of the patient and discussed management with the resident. I reviewed the residents note and agree with the documented findings and plan of care. Any areas of disagreement are noted on the chart. I was personally present for the key portions of any procedures. I have documented in the chart those procedures where I was not present during the key portions. I have reviewed the emergency nurses triage note. I agree with the chief complaint, past medical history, past surgical history, allergies, medications, social and family history as documented unless otherwise noted below. For mid-level providers such as nurse practitioners as well as physicians assistants:    I have personally seen and evaluated the patient. I find the patient's history and physical exam are consistent with NP/PA documentation. I agree with the care provided, treatment rendered, disposition, & follow-up plan. Additional findings are as noted. Vital Signs: BP (!) 132/118   Pulse 89   Temp 98.2 °F (36.8 °C)   Resp 18   Wt 120 lb (54.4 kg)   SpO2 96%   BMI 21.26 kg/m²   PCP:  No primary care provider on file. Pertinent Comments:     Patient 3 days ago was working and feels like he got some sort of chemical in his eyes and has some burning in both of his eyes. No visual loss does not wear contacts or corrective lenses. Some mild photophobia. Exam he is afebrile nontoxic he has some mild periorbital swelling but no erythema or warmth. Extraocular movements are intact. No discharge noted.       Critical Care  None          Amarjit Fajardo MD    Attending Emergency Medicine Physician              Radha Kurtz MD  02/21/22 5780

## 2022-02-21 NOTE — ED PROVIDER NOTES
Single     Spouse name: Not on file    Number of children: Not on file    Years of education: Not on file    Highest education level: Not on file   Occupational History    Not on file   Tobacco Use    Smoking status: Current Every Day Smoker    Smokeless tobacco: Not on file   Substance and Sexual Activity    Alcohol use: Yes    Drug use: No    Sexual activity: Not on file   Other Topics Concern    Not on file   Social History Narrative    Not on file     Social Determinants of Health     Financial Resource Strain:     Difficulty of Paying Living Expenses: Not on file   Food Insecurity:     Worried About Running Out of Food in the Last Year: Not on file    Nisa of Food in the Last Year: Not on file   Transportation Needs:     Lack of Transportation (Medical): Not on file    Lack of Transportation (Non-Medical): Not on file   Physical Activity:     Days of Exercise per Week: Not on file    Minutes of Exercise per Session: Not on file   Stress:     Feeling of Stress : Not on file   Social Connections:     Frequency of Communication with Friends and Family: Not on file    Frequency of Social Gatherings with Friends and Family: Not on file    Attends Christianity Services: Not on file    Active Member of [address] [place] [address] [employer] or Organizations: Not on file    Attends Club or Organization Meetings: Not on file    Marital Status: Not on file   Intimate Partner Violence:     Fear of Current or Ex-Partner: Not on file    Emotionally Abused: Not on file    Physically Abused: Not on file    Sexually Abused: Not on file   Housing Stability:     Unable to Pay for Housing in the Last Year: Not on file    Number of [place] in the Last Year: Not on file    Unstable Housing in the Last Year: Not on file       I counseled the patient against using tobacco products. History reviewed. No pertinent family history. No other pertinent FamHx on review with patient/guardian.     Allergies:  Patient has no known allergies. Home Medications:  Prior to Admission medications    Medication Sig Start Date End Date Taking? Authorizing Provider   ciprofloxacin (CILOXAN) 0.3 % ophthalmic solution Place 2 drops into both eyes in the morning and at bedtime for 4 days 2/21/22 2/25/22 Yes Pretty Kelly MD   ketorolac (ACULAR) 0.5 % ophthalmic solution Place 1 drop into both eyes 4 times daily for 7 days 2/21/22 2/28/22 Yes Pretty Kelly MD   acetaminophen (TYLENOL) 500 MG tablet Take 2 tablets by mouth every 6 hours as needed for Pain 9/26/21   Alber Pozo MD   albuterol sulfate HFA (VENTOLIN HFA) 108 (90 Base) MCG/ACT inhaler Inhale 2 puffs into the lungs every 4 hours as needed for Wheezing or Shortness of Breath 9/26/21   Alber Pozo MD   ibuprofen (ADVIL;MOTRIN) 800 MG tablet Take 1 tablet by mouth every 8 hours as needed for Pain 9/24/16   Olga Manning PA-C   naproxen (NAPROSYN) 500 MG tablet Take 1 tablet by mouth 2 times daily (with meals). 9/10/14   Blane Nielsen MD   albuterol (PROVENTIL) (2.5 MG/3ML) 0.083% nebulizer solution Take 2.5 mg by nebulization every 4 hours as needed for Wheezing. Historical Provider, MD   ALBUTEROL SULFATE IN Inhale 2 puffs into the lungs 4 times daily as needed. Historical Provider, MD       REVIEW OF SYSTEMS    (2-9 systems for level 4, 10 ormore for level 5)      Review of Systems   Constitutional: Negative for activity change, appetite change, fatigue and fever. HENT: Negative for congestion. Eyes: Positive for pain, redness, itching and visual disturbance. Negative for photophobia and discharge. Respiratory: Negative. Cardiovascular: Negative. Gastrointestinal: Negative for abdominal distention, constipation, diarrhea and vomiting. Genitourinary: Negative for difficulty urinating, dysuria, penile discharge, penile pain, penile swelling, scrotal swelling, testicular pain and urgency. Musculoskeletal: Negative for arthralgias, back pain and myalgias. Skin: Negative. Neurological: Negative for dizziness, facial asymmetry and headaches. Psychiatric/Behavioral: Negative for agitation and confusion. The patient is not nervous/anxious and is not hyperactive. PHYSICAL EXAM   (up to 7 for level 4, 8 or more for level 5)      INITIAL VITALS:   BP (!) 125/91   Pulse 89   Temp 98.2 °F (36.8 °C)   Resp 18   Wt 120 lb (54.4 kg)   SpO2 97%   BMI 21.26 kg/m²     Physical Exam  Constitutional:       Appearance: Normal appearance. HENT:      Head: Normocephalic and atraumatic. Nose: Nose normal.      Mouth/Throat:      Mouth: Mucous membranes are moist.      Pharynx: Oropharynx is clear. Eyes:      Comments: Visual acuity 20/100 bilaterally   Cardiovascular:      Rate and Rhythm: Normal rate and regular rhythm. Pulses: Normal pulses. Heart sounds: Normal heart sounds. Pulmonary:      Effort: Pulmonary effort is normal.      Breath sounds: Normal breath sounds. Abdominal:      General: Abdomen is flat. Musculoskeletal:      Cervical back: Normal range of motion and neck supple. Comments: Extreme tenderness on the posterior aspect of the elbow both superior and inferior to the joint. Pain in the anterior hip.  4 small marks appear to be in alignment on the right side of his leg and a single spot on the left leg that appears as though there ingrown hairs. Skin:     General: Skin is warm and dry. Capillary Refill: Capillary refill takes less than 2 seconds. Neurological:      General: No focal deficit present. Mental Status: He is alert. Mental status is at baseline.    Psychiatric:         Mood and Affect: Mood normal.         DIFFERENTIAL  DIAGNOSIS     DDX: Keratitis, conjunctivitis,    PLAN (LABS / IMAGING / EKG):  Orders Placed This Encounter   Procedures    Inpatient consult to Ophthalmology       MEDICATIONS ORDERED:  Orders Placed This Encounter   Medications    proparacaine (ALCAINE) 0.5 % ophthalmic solution 1 drop    fluorescein ophthalmic strip 1 mg    ciprofloxacin (CILOXAN) 0.3 % ophthalmic solution     Sig: Place 2 drops into both eyes in the morning and at bedtime for 4 days     Dispense:  10 mL     Refill:  0    ketorolac (ACULAR) 0.5 % ophthalmic solution     Sig: Place 1 drop into both eyes 4 times daily for 7 days     Dispense:  3 mL     Refill:  0           DIAGNOSTIC RESULTS / EMERGENCY DEPARTMENT COURSE / MDM     LABS:  No results found for this visit on 02/21/22. IMPRESSION/MDM/ED COURSE:  43 y.o. male presented with blurry vision. Will perform [place] exam and stain eyes. Will contact ophthalmology for clarification and recommendations. UV light exam as the exam reveal area of slightly speckled appearance on the patient's eyes appear to be some form of chemical burn will contact ophthalmology    Pathology agrees the patient may have had some chemical exposure or UV light exposure given his occupation. Recommended giving him outpatient antibiotic drops as well as [place] drops have him follow-up with ophthalmology in 3 days if symptoms not resolved. Patient/Guardian requesting discharge. Patient/Guardian was given written and verbal instructions prior to discharge. Patient/Guardian understood and agreed. Patient/Guardian had no further questions. RADIOLOGY:  No orders to display         EKG  None    All EKG's are interpreted by the Emergency Department Physician who either signs or Co-signs this chart in the absence of a cardiologist.      PROCEDURES:  None    CONSULTS:  IP CONSULT TO OPHTHALMOLOGY        FINAL IMPRESSION      1. Superficial injury of left cornea, initial encounter    2.  Superficial injury of right cornea, initial encounter          DISPOSITION / PLAN       DISPOSITION Decision To Discharge 02/21/2022 10:59:02 AM        PATIENT REFERREDTO:  [facility]  [address] [place] [address]  [phone]    As needed    Zbigniew Graham Lesly Peralta, [address] [address] [address]    Call       Syed Vargas MD    Schedule an appointment as soon as possible for a visit   Contact in 3 days if symptoms have not significantly resolved.       DISCHARGE MEDICATIONS:  Discharge Medication List as of 2/21/2022 11:08 AM      START taking these medications    Details   ciprofloxacin (CILOXAN) 0.3 % ophthalmic solution Place 2 drops into both eyes in the morning and at bedtime for 4 days, Disp-10 mL, R-0Print      ketorolac (ACULAR) 0.5 % ophthalmic solution Place 1 drop into both eyes 4 times daily for 7 days, Disp-3 mL, R-0Print             Abelino Luque MD  PGY 2  Resident Physician Emergency Medicine  02/21/22 8:02 PM        (Please note that portions of this note were completed with a voice recognition program.Efforts were made to edit the dictations but occasionally words are mis-transcribed.)        Abelino Luque MD  Resident  02/21/22 2003

## 2022-02-21 NOTE — ED NOTES
Pt arrived with complaints of eye pain that started on Thursday. 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 Pt reports eye sensitivity and blurred vision since. Pt stated that he also began to had bumps appear throughout his skin. Pt denies any chest pain or SOB. Resident at the bedside.  Will continue plan of care      Norma Garcia RN  02/21/22 1323

## 2022-02-27 ENCOUNTER — HOSPITAL ENCOUNTER (EMERGENCY)
Age: 42
Discharge: HOME | End: 2022-02-27
Attending: EMERGENCY MEDICINE

## 2022-02-27 VITALS
OXYGEN SATURATION: 93 % | RESPIRATION RATE: 20 BRPM | SYSTOLIC BLOOD PRESSURE: 160 MMHG | HEART RATE: 87 BPM | TEMPERATURE: 98.6 F | DIASTOLIC BLOOD PRESSURE: 111 MMHG

## 2022-02-27 DIAGNOSIS — H53.8 BLURRY VISION, LEFT EYE: Primary | ICD-10-CM

## 2022-02-27 PROCEDURE — 6370000000 HC RX 637 (ALT 250 FOR IP): Performed by: STUDENT IN AN ORGANIZED HEALTH CARE EDUCATION/TRAINING PROGRAM

## 2022-02-27 PROCEDURE — 99285 EMERGENCY DEPT VISIT HI MDM: CPT

## 2022-02-27 PROCEDURE — 2500000003 HC RX 250 WO HCPCS

## 2022-02-27 RX ORDER — ASPIRIN 81 MG/1
81 TABLET ORAL DAILY
COMMUNITY
Start: 2021-03-22

## 2022-02-27 RX ORDER — ATORVASTATIN CALCIUM 10 MG/1
10 TABLET, FILM COATED ORAL NIGHTLY
COMMUNITY
Start: 2021-03-21

## 2022-02-27 RX ORDER — AMLODIPINE BESYLATE 5 MG/1
5 TABLET ORAL DAILY
COMMUNITY
Start: 2021-03-22

## 2022-02-27 RX ORDER — PROPARACAINE HYDROCHLORIDE 5 MG/ML
SOLUTION/ DROPS OPHTHALMIC
Status: COMPLETED
Start: 2022-02-27 | End: 2022-02-27

## 2022-02-27 RX ORDER — PROPARACAINE HYDROCHLORIDE 5 MG/ML
1 SOLUTION/ DROPS OPHTHALMIC ONCE
Status: COMPLETED | OUTPATIENT
Start: 2022-02-27 | End: 2022-02-27

## 2022-02-27 RX ADMIN — FLUORESCEIN SODIUM 1 MG: 1 STRIP OPHTHALMIC at 22:31

## 2022-02-27 RX ADMIN — PROPARACAINE HYDROCHLORIDE 1 DROP: 5 SOLUTION/ DROPS OPHTHALMIC at 22:32

## 2022-02-27 ASSESSMENT — ENCOUNTER SYMPTOMS
EYE REDNESS: 0
CONSTIPATION: 0
NAUSEA: 0
SHORTNESS OF BREATH: 0
COUGH: 0
ABDOMINAL PAIN: 0
DIARRHEA: 0
CHEST TIGHTNESS: 0
WHEEZING: 0
PHOTOPHOBIA: 0
VOMITING: 0
EYE PAIN: 1
BACK PAIN: 0
RHINORRHEA: 0
EYE DISCHARGE: 1

## 2022-02-28 NOTE — ED PROVIDER NOTES
[facility]  ED  Emergency Department Encounter  EmergencyMedicine Resident     Pt Name:Waqas Molina  MRN: [id number]  [place] 1980  Date of evaluation: 2/27/22  PCP:  No primary care provider on file. CHIEF COMPLAINT       Chief Complaint   Patient presents with    Eye Pain     left side        HISTORY OF PRESENT ILLNESS  (Location/Symptom, Timing/Onset, Context/Setting, Quality, Duration, Modifying Factors, Severity.)      Cody Ruiz is a 43 y.o. male who presents with eye pain and blurry vision. Patient seen several days ago for the same and did not fill his prescriptions. Patient states he dropped him off at the pharmacy but did not pick them up. He has an appointment with ophthalmologist tomorrow morning at 10 AM.  Patient states the left eye pain has persisted. He has had consistent blurry vision preceding his last visit. Has not taken anything for the pain. No weakness, numbness, tingling. Does complain of a mild headache. Also states sometimes his eye is watery but no purulent discharge. PAST MEDICAL / SURGICAL / SOCIAL / FAMILY HISTORY      has a past medical history of Asthma. has a past surgical history that includes hernia repair and Testicle removal (Right). Social History     Socioeconomic History    Marital status: Single     Spouse name: Not on file    Number of children: Not on file    Years of education: Not on file    Highest education level: Not on file   Occupational History    Not on file   Tobacco Use    Smoking status: Current Every Day Smoker    Smokeless tobacco: Never Used   Substance and Sexual Activity    Alcohol use:  Yes    Drug use: No    Sexual activity: Yes   Other Topics Concern    Not on file   Social History Narrative    Not on file     Social Determinants of Health     Financial Resource Strain:     Difficulty of Paying Living Expenses: Not on file   Food Insecurity:     Worried About [address] in the Last Year: Not on file    Ran Out of Food in the Last Year: Not on file   Transportation Needs:     Lack of Transportation (Medical): Not on file    Lack of Transportation (Non-Medical): Not on file   Physical Activity:     Days of Exercise per Week: Not on file    Minutes of Exercise per Session: Not on file   Stress:     Feeling of Stress : Not on file   Social Connections:     Frequency of Communication with Friends and Family: Not on file    Frequency of Social Gatherings with Friends and Family: Not on file    Attends Pent[employer]ostalism Services: Not on file    Active Member of [address] [place] [address] or Organizations: Not on file    Attends Club or Organization Meetings: Not on file    Marital Status: Not on file   Intimate Partner Violence:     Fear of Current or Ex-Partner: Not on file    Emotionally Abused: Not on file    Physically Abused: Not on file    Sexually Abused: Not on file   Housing Stability:     Unable to Pay for Housing in the Last Year: Not on file    Number of [place] in the Last Year: Not on file    Unstable Housing in the Last Year: Not on file       History reviewed. No pertinent family history. Allergies:  Hydrocodone-acetaminophen and Other    Home Medications:  Prior to Admission medications    Medication Sig Start Date End Date Taking? Authorizing Provider   albuterol sulfate HFA (VENTOLIN HFA) 108 (90 Base) MCG/ACT inhaler Inhale 2 puffs into the lungs every 4 hours as needed for Wheezing or Shortness of Breath 9/26/21  Yes Monika Agudelo MD   ibuprofen (ADVIL;MOTRIN) 800 MG tablet Take 1 tablet by mouth every 8 hours as needed for Pain 9/24/16  Yes Seema Daniel PA-C   naproxen (NAPROSYN) 500 MG tablet Take 1 tablet by mouth 2 times daily (with meals). 9/10/14  Yes Marissa Nielsen MD   albuterol (PROVENTIL) (2.5 MG/3ML) 0.083% nebulizer solution Take 2.5 mg by nebulization every 4 hours as needed for Wheezing.    Yes Historical Provider, MD   ALBUTEROL SULFATE IN Inhale 2 puffs into the lungs 4 times daily as needed. Yes Historical Provider, MD   amLODIPine (NORVASC) 5 MG tablet Take 5 mg by mouth daily 3/22/21   Historical Provider, MD   aspirin 81 MG EC tablet Take 81 mg by mouth daily 3/22/21   Historical Provider, MD   atorvastatin (LIPITOR) 10 MG tablet Take 10 mg by mouth nightly 3/21/21   Historical Provider, MD   ketorolac (ACULAR) 0.5 % ophthalmic solution Place 1 drop into both eyes 4 times daily for 7 days 2/21/22 2/28/22  Elzbieta Morales MD   acetaminophen (TYLENOL) 500 MG tablet Take 2 tablets by mouth every 6 hours as needed for Pain 9/26/21   Adam Velasquez MD       REVIEW OF SYSTEMS    (2-9 systems for level 4, 10 or more for level 5)      Review of Systems   Constitutional: Negative for chills, diaphoresis, fatigue and fever. HENT: Negative for congestion and rhinorrhea. Eyes: Positive for pain (Left), discharge (Watery) and visual disturbance (Blurred vision, bilateral but left worse than right). Negative for photophobia and redness. Respiratory: Negative for cough, chest tightness, shortness of breath and wheezing. Cardiovascular: Negative for chest pain, palpitations and leg swelling. Gastrointestinal: Negative for abdominal pain, constipation, diarrhea, nausea and vomiting. Endocrine: Negative for polydipsia, polyphagia and polyuria. Genitourinary: Negative for difficulty urinating, dysuria, flank pain and hematuria. Musculoskeletal: Negative for arthralgias, back pain, neck pain and neck stiffness. Neurological: Negative for dizziness, weakness, light-headedness, numbness and headaches. PHYSICAL EXAM   (up to 7 for level 4, 8 or more for level 5)      INITIAL VITALS:   BP (!) 160/111   Pulse 87   Temp 98.6 °F (37 °C) (Oral)   SpO2 93%     Physical Exam  Constitutional:       General: He is not in acute distress. Appearance: Normal appearance. HENT:      Head: Normocephalic and atraumatic.       Nose: No congestion or rhinorrhea. Eyes:      General: No scleral icterus. Left eye: Discharge (Watery) present. Pupils: Pupils are equal, round, and reactive to light. Comments: Mild conjunctival injection of the left eye, patient not cooperative with extraocular movements of either eye. Wood's lamp exam without corneal defect or abrasion   Cardiovascular:      Rate and Rhythm: Normal rate and regular rhythm. Pulmonary:      Effort: Pulmonary effort is normal.      Breath sounds: Normal breath sounds. Musculoskeletal:      Cervical back: Normal range of motion. No rigidity. Skin:     Capillary Refill: Capillary refill takes less than 2 seconds. Neurological:      General: No focal deficit present. Mental Status: He is alert and oriented to person, place, and time. Cranial Nerves: No cranial nerve deficit. DIFFERENTIAL  DIAGNOSIS     PLAN (LABS / IMAGING / EKG):  No orders of the defined types were placed in this encounter. MEDICATIONS ORDERED:  Orders Placed This Encounter   Medications    fluorescein ophthalmic strip 1 mg    proparacaine (ALCAINE) 0.5 % ophthalmic solution 1 drop    proparacaine (ALCAINE) 0.5 % ophthalmic solution     Batch, Rach M: cabinet override       DDX: Chronic vision loss, uveitis, conjunctivitis, UV keratitis    MDM/IMPRESSION: Is a 49-year-old male presents with left eye pain and bilateral blurry vision left worse than the right. Symptoms ongoing for least 5 days. Was seen last Thursday, prescribed drops but did not fill them. Patient continues to be overall poor, 20/100 left eye 20/70 in the right eye. Patient was 20/100 bilaterally last week. Afebrile, nontoxic in, no acute distress patient does have follow up with ophthalmology tomorrow morning. DIAGNOSTIC RESULTS / EMERGENCY DEPARTMENT COURSE / MDM   LAB RESULTS:  No results found for this visit on 02/27/22.       RADIOLOGY:  No orders to display          EMERGENCY DEPARTMENT COURSE:  ED Course as of 02/27/22 2250   Sun Feb 27, 2022 2236 With lab exam unremarkable. Patient has an ophthalmology appointment tomorrow morning at 10 AM but cannot remember the physician's name. Patient also states he will  his prescriptions from the pharmacy tomorrow morning. [JG]      ED Course User Index  [JG] Zain Andrade DO        PROCEDURES:      CONSULTS:  None    CRITICAL CARE:      FINAL IMPRESSION      1.  Blurry vision, left eye          DISPOSITION / PLAN     DISPOSITION Decision To Discharge 02/27/2022 10:35:28 PM      PATIENT REFERRED TO:  [facility]  [address] [address]  [phone]  Go to   If symptoms worsen      DISCHARGE MEDICATIONS:  New Prescriptions    No medications on file       Zain Andrade DO  Emergency Medicine Resident    (Please note that portions of thisnote were completed with a voice recognition program.  Efforts were made to edit the dictations but occasionally words are mis-transcribed.)     Zain Andrade DO  02/27/22 2250

## 2022-02-28 NOTE — ED NOTES
Visual acuity eye screening   Right is 20/70  Left eye 20/100    Pt states all lines are really blurry      Sonia Vargas, DOMINIQUE  02/27/22 3167

## 2022-02-28 NOTE — ED PROVIDER NOTES
[facility]     Emergency Department     Faculty Attestation        I performed a history and physical examination of the patient and discussed management with the resident. I reviewed the residents note and agree with the documented findings and plan of care. Any areas of disagreement are noted on the chart. I was personally present for the key portions of any procedures. I have documented in the chart those procedures where I was not present during the key portions. I have reviewed the emergency nurses triage note. I agree with the chief complaint, past medical history, past surgical history, allergies, medications, social and family history as documented unless otherwise noted below. For mid-level providers such as nurse practitioners as well as physicians assistants:    I have personally seen and evaluated the patient. I find the patient's history and physical exam are consistent with NP/PA documentation. I agree with the care provided, treatment rendered, disposition, & follow-up plan. Additional findings are as noted. Vital Signs: There were no vitals taken for this visit. PCP:  No primary care provider on file. Pertinent Comments:     Complains of left eye irritation he was seen here recently for this and had swelling of examination that was suspicious for UV keratitis and had some corneal uptake in a linear fashion longitudinally over his eye. He was discharged with antibiotics and topical pain control but unfortunately he did not get the prescriptions filled. He has a follow-up appointment with ophthalmology tomorrow. I saw him last time he was here in his eyes look improved.   Due to continued symptoms will obtain visual acuities, repeat Collin Rachel lamp, reassessment      Critical Care  None          Jacinto Lott MD    Attending Emergency Medicine Physician              Hernesto Weiss MD  02/27/22 2291

## 2022-02-28 NOTE — ED TRIAGE NOTES
Pt present to ER for left eye pain  He reports he was seen this past Thursday for for the same concern- left eye pain    Pt has not started his prescribed medications , still needs to pick them up he reports; due to not having a ride     Does have an appointment with the eye doctor   2/28/2022    Left eye pain is about a 4/10   Is having a trouble seeing  with bright lights, blurryness  C/o of drainage to the left eye that is clear, like tears    C/o of headache over the left eye area  6/10, pressure like, continuous     Is not taking any thing for the headache or eye pain     Eating and drinking without concerns

## 2022-08-31 ENCOUNTER — APPOINTMENT (OUTPATIENT)
Dept: GENERAL RADIOLOGY | Age: 42
End: 2022-08-31

## 2022-08-31 ENCOUNTER — HOSPITAL ENCOUNTER (EMERGENCY)
Age: 42
Discharge: HOME | End: 2022-08-31
Attending: EMERGENCY MEDICINE

## 2022-08-31 VITALS
HEIGHT: 63 IN | WEIGHT: 126 LBS | BODY MASS INDEX: 22.32 KG/M2 | HEART RATE: 83 BPM | SYSTOLIC BLOOD PRESSURE: 128 MMHG | RESPIRATION RATE: 20 BRPM | TEMPERATURE: 97.9 F | OXYGEN SATURATION: 99 % | DIASTOLIC BLOOD PRESSURE: 82 MMHG

## 2022-08-31 DIAGNOSIS — M79.671 RIGHT FOOT PAIN: Primary | ICD-10-CM

## 2022-08-31 PROCEDURE — 6370000000 HC RX 637 (ALT 250 FOR IP): Performed by: EMERGENCY MEDICINE

## 2022-08-31 PROCEDURE — 99284 EMERGENCY DEPT VISIT MOD MDM: CPT

## 2022-08-31 PROCEDURE — 73630 X-RAY EXAM OF FOOT: CPT

## 2022-08-31 RX ORDER — IBUPROFEN 800 MG/1
800 TABLET ORAL ONCE
Status: COMPLETED | OUTPATIENT
Start: 2022-08-31 | End: 2022-08-31

## 2022-08-31 RX ADMIN — IBUPROFEN 800 MG: 800 TABLET, FILM COATED ORAL at 11:13

## 2022-08-31 ASSESSMENT — PAIN SCALES - GENERAL
PAINLEVEL_OUTOF10: 7
PAINLEVEL_OUTOF10: 7

## 2022-08-31 ASSESSMENT — PAIN DESCRIPTION - ORIENTATION: ORIENTATION: RIGHT

## 2022-08-31 ASSESSMENT — PAIN DESCRIPTION - LOCATION: LOCATION: FOOT

## 2022-08-31 ASSESSMENT — PAIN - FUNCTIONAL ASSESSMENT: PAIN_FUNCTIONAL_ASSESSMENT: 0-10

## 2022-08-31 NOTE — ED NOTES
Pt reports right pinky toe pain. Pt states he injured his toe \"a while back\" but states pain started again approx 1 week ago   Pt states increased pain with pressure. No redness/swelling noted. Pt denies any other concern.   Pt A&Ox4, RR even/unlabored, NAD noted , call light within reach      [facility], RN  08/31/22 9683

## 2022-08-31 NOTE — ED PROVIDER NOTES
[facility]  [address] [facility]  Phone: [phone]        Pt Name: Lizzie Quiñonez  MRN: [id number]  [place] 1980  Date of evaluation: 8/31/22      CHIEF COMPLAINT     Chief Complaint   Patient presents with    Foot Pain     Injured R foot 2 years ago          HISTORY OF PRESENT ILLNESS  (Location/Symptom, Timing/Onset, Context/Setting, Quality, Duration, Modifying Factors, Severity.)    Lizzie Quiñonez is a 43 y.o. male who presents with foot pain. The patient states that 2 years ago he was moving, and dropped a bed frame on his right foot. Patient states that he never sought medical care at that time, and since then he has had pain to his foot. He is able to ambulate. His pain is located over the lateral aspect of the foot. REVIEW OF SYSTEMS    (2-9 systems for level 4, 10 or more for level 5)     Constitutional: no fever, chills, fatigue  HENT: No headache, nasal congestion, sore throat, hearing changes, ear pain or discharge  Eyes: no visual changes or photophobia  Respiratory: no cough, shortness of breath, or wheezing  Cardiovascular: no chest pain, palpitations, or leg swelling  Abdominal: no abdominal pain, nausea, vomiting, diarrhea, or constipation  Genitourinary: no dysuria, frequency, or urgency  Musculoskeletal: no arthralgias, myalgias, neck or back pain  Skin: no rash or wound  Neurological: no numbness, tingling or weakness  Hematologic:  no history of easy bleeding or bruising          PAST MEDICAL HISTORY    has a past medical history of Asthma. SURGICAL HISTORY      has a past surgical history that includes hernia repair and Testicle removal (Right). CURRENTMEDICATIONS       Previous Medications    ACETAMINOPHEN (TYLENOL) 500 MG TABLET    Take 2 tablets by mouth every 6 hours as needed for Pain    ALBUTEROL (PROVENTIL) (2.5 MG/3ML) 0.083% NEBULIZER SOLUTION    Take 2.5 mg by nebulization every 4 hours as needed for Wheezing.     ALBUTEROL SULFATE HFA (VENTOLIN HFA) 108 (90 BASE) MCG/ACT INHALER    Inhale 2 puffs into the lungs every 4 hours as needed for Wheezing or Shortness of Breath    ALBUTEROL SULFATE IN    Inhale 2 puffs into the lungs 4 times daily as needed. AMLODIPINE (NORVASC) 5 MG TABLET    Take 5 mg by mouth daily    ASPIRIN 81 MG EC TABLET    Take 81 mg by mouth daily    ATORVASTATIN (LIPITOR) 10 MG TABLET    Take 10 mg by mouth nightly    IBUPROFEN (ADVIL;MOTRIN) 800 MG TABLET    Take 1 tablet by mouth every 8 hours as needed for Pain    NAPROXEN (NAPROSYN) 500 MG TABLET    Take 1 tablet by mouth 2 times daily (with meals). ALLERGIES     is allergic to hydrocodone-acetaminophen and other. FAMILY HISTORY     has no family status information on file. family history is not on file. SOCIAL HISTORY      reports that he has been smoking. He has never used smokeless tobacco. He reports current alcohol use. He reports that he does not use drugs. PHYSICAL EXAM    (up to 7 for level 4, 8 or more for level 5)   INITIAL VITALS:  height is 5' 3\" (1.6 m) and weight is 126 lb (57.2 kg). His oral temperature is 97.9 °F (36.6 °C). His blood pressure is 128/82 and his pulse is 83. His respiration is 20 and oxygen saturation is 99%. Physical Exam  Vitals and nursing note reviewed. Constitutional:       Appearance: Normal appearance. HENT:      Head: Normocephalic and atraumatic. Eyes:      Extraocular Movements: Extraocular movements intact. Pupils: Pupils are equal, round, and reactive to light. Cardiovascular:      Pulses:           Dorsalis pedis pulses are 2+ on the right side. Posterior tibial pulses are 2+ on the right side. Musculoskeletal:         General: Tenderness present. No swelling or deformity. Normal range of motion. Right foot: Normal range of motion. No deformity or bunion. Comments: Tender over the 5th metatarsal and 5th digit.     Feet:      Right foot:      Skin integrity: No erythema or warmth. Skin:     General: Skin is warm and dry. Capillary Refill: Capillary refill takes less than 2 seconds. Neurological:      General: No focal deficit present. Mental Status: He is alert and oriented to person, place, and time. DIFFERENTIAL DIAGNOSIS/ MDM:     The patient presented with foot pain. A fracture was not detected on X-ray. The ankle and knee joints were not affected. No skin lesions were seen. There are no signs of compartment syndrome. The pulses are 2/4. The motor is 5/5. The sensation is intact. The patient was advised to return to the Emergency Department for increasing pain, numbness, weakness, or coldness to the extremity. The patient was further instructed to follow up in 2-3 days with their family doctor or orthopedics. The patient voiced understanding of these instructions. The patient understands that at this time there is no evidence for a more malignant underlying process, but the patient also understands that early in the process of an illness or injury, an emergency department workup can be falsely reassuring. Routine discharge counseling was given, and the patient understands that worsening, changing or persistent symptoms should prompt an immediate call or follow up with their primary physician or return to the emergency department. The importance of appropriate follow up was also discussed. I have reviewed the disposition diagnosis with the patient and or their family/guardian. I have answered their questions and given discharge instructions. They voiced understanding of these instructions and did not have any further questions or complaints.       DIAGNOSTIC RESULTS     EKG: All EKG's are interpreted by the Emergency Department Physician who either signs or Co-signs this chart in the absence of a cardiologist.    none    RADIOLOGY:        Interpretation per the Radiologist below, if available at the time of this note:    XR FOOT RIGHT (MIN 3 VIEWS)    Result Date: 8/31/2022  EXAMINATION: THREE XRAY VIEWS OF THE RIGHT FOOT 8/31/2022 12:13 pm COMPARISON: None. HISTORY: Acute pain. FINDINGS: No acute fracture or dislocation. No significant degenerative changes. Soft tissues are unremarkable. Negative right foot. LABS:  No results found for this visit on 08/31/22. none    EMERGENCY DEPARTMENT COURSE:   Vitals:    Vitals:    08/31/22 1058   BP: 128/82   Pulse: 83   Resp: 20   Temp: 97.9 °F (36.6 °C)   TempSrc: Oral   SpO2: 99%   Weight: 126 lb (57.2 kg)   Height: 5' 3\" (1.6 m)     -------------------------  BP: 128/82, Temp: 97.9 °F (36.6 °C), Heart Rate: 83, Resp: 20      RE-EVALUATION:  12:56 PM EDT  Patient updated on test result and plan of care. CONSULTS:  none    PROCEDURES:  None    FINAL IMPRESSION      1. Right foot pain          DISPOSITION/PLAN   DISPOSITION Decision To Discharge 08/31/2022 12:56:38 PM      CONDITION ON DISPOSITION:   Stable     PATIENT REFERRED TO:    Please call [phone] for a follow up appointment. I would like for you to be rechecked in 2-3 days.         DISCHARGE MEDICATIONS:  New Prescriptions    No medications on file       (Please note that portions of this note were completed with a voicerecognition program.  Efforts were made to edit the dictations but occasionally words are mis-transcribed.)    Sneha March MD  Attending Emergency Medicine Physician       Sneha March MD  08/31/22 1257

## 2022-08-31 NOTE — Clinical Note
Reza Gongora was seen and treated in our emergency department on 8/31/2022. He may return to work on 09/01/2022. If you have any questions or concerns, please don't hesitate to call.       Arabella Valenzuela MD

## 2023-03-13 ENCOUNTER — APPOINTMENT (OUTPATIENT)
Dept: GENERAL RADIOLOGY | Age: 43
End: 2023-03-13

## 2023-03-13 ENCOUNTER — HOSPITAL ENCOUNTER (EMERGENCY)
Age: 43
Discharge: LEFT BEFORE DISCHARGE | End: 2023-03-13
Attending: EMERGENCY MEDICINE

## 2023-03-13 DIAGNOSIS — M79.602 LEFT ARM PAIN: ICD-10-CM

## 2023-03-13 DIAGNOSIS — Z53.21 ELOPED FROM EMERGENCY DEPARTMENT: Primary | ICD-10-CM

## 2023-03-13 PROCEDURE — 99283 EMERGENCY DEPT VISIT LOW MDM: CPT

## 2023-03-13 ASSESSMENT — ENCOUNTER SYMPTOMS
DIARRHEA: 0
BACK PAIN: 0
COUGH: 0
VOMITING: 0
ABDOMINAL PAIN: 0
RHINORRHEA: 0
CONSTIPATION: 0
SHORTNESS OF BREATH: 0
NAUSEA: 0

## 2023-03-13 NOTE — ED PROVIDER NOTES
[facility]  Emergency Department  Faculty Attestation     PERTINENT ATTENDING PHYSICIAN COMMENTS:    Patient left the emergency department prior to taking history or performing physical examination. The patient may have had care initiated by the resident.     Daria Gifford MD  Attending Emergency  Physician    (Please note that portions of this note were completed with a voice recognition program.  Efforts were made to edit the dictations but occasionally words are mis-transcribed.)        Daria Gifford MD  03/13/23 [id number]

## 2023-03-13 NOTE — ED PROVIDER NOTES
[facility]  Emergency Department Encounter  Emergency Medicine Resident     Pt Name:Waqas Hernandez  MRN: [id number]  [place] 1980  Date of evaluation: 3/13/23  PCP:  No primary care provider on file. Note Started: 2:20 PM EDT      CHIEF COMPLAINT       No chief complaint on file. HISTORY OF PRESENT ILLNESS  (Location/Symptom, Timing/Onset, Context/Setting, Quality, Duration, Modifying Factors, Severity.)      Michael Dennis is a 37 y.o. male who presents with concerns of stroke with left-sided pain and weakness. He states his left arm has been painful and weak for the last 2 to 3 days. He has had similar symptoms a few years ago at which time he was worked up for stroke, he told me that they did find a stroke. Unable to confirm this in the EMR but he was admitted for a large stroke work-up. MRI was unable to be performed due to metal on his teeth. Patient reports this increased pain throughout the entire left arm with difficulty moving and any range of motion. He has significant pain to the anterior shoulder region without known injury. He notes a scab that he found in the posterior shoulder but does not remember where it came from. He has been drinking more recently. He also states his diet has not been well. He has fallen asleep with the arm draped over a couch armrest and propped up on a pillow, 2 nights in a row. He denies any other injury or abnormal recent happenings. No fevers or chills, chest pain or shortness of breath. No other weakness, numbness, tingling. No headaches or vision changes. PAST MEDICAL / SURGICAL / SOCIAL / FAMILY HISTORY      has a past medical history of Asthma. has a past surgical history that includes hernia repair and Testicle removal (Right).       Social History     Socioeconomic History    Marital status: Single     Spouse name: Not on file    Number of children: Not on file    Years of education: Not on file    Highest education level: Not on file   Occupational History    Not on file   Tobacco Use    Smoking status: Every Day    Smokeless tobacco: Never   Substance and Sexual Activity    Alcohol use: Yes    Drug use: No    Sexual activity: Yes   Other Topics Concern    Not on file   Social History Narrative    Not on file     Social Determinants of Health     Financial Resource Strain: Not on file   Food Insecurity: Not on file   Transportation Needs: Not on file   Physical Activity: Not on file   Stress: Not on file   Social Connections: Not on file   Intimate Partner Violence: Not on file   Housing Stability: Not on file       No family history on file. Allergies:  Hydrocodone-acetaminophen and Other    Home Medications:  Prior to Admission medications    Medication Sig Start Date End Date Taking? Authorizing Provider   amLODIPine (NORVASC) 5 MG tablet Take 5 mg by mouth daily 3/22/21   Historical Provider, MD   aspirin 81 MG EC tablet Take 81 mg by mouth daily 3/22/21   Historical Provider, MD   atorvastatin (LIPITOR) 10 MG tablet Take 10 mg by mouth nightly 3/21/21   Historical Provider, MD   acetaminophen (TYLENOL) 500 MG tablet Take 2 tablets by mouth every 6 hours as needed for Pain 9/26/21   Mellisa Sevilla MD   albuterol sulfate HFA (VENTOLIN HFA) 108 (90 Base) MCG/ACT inhaler Inhale 2 puffs into the lungs every 4 hours as needed for Wheezing or Shortness of Breath 9/26/21   Mellisa Sevilla MD   ibuprofen (ADVIL;MOTRIN) 800 MG tablet Take 1 tablet by mouth every 8 hours as needed for Pain 9/24/16   Palak Duncan PA-C   naproxen (NAPROSYN) 500 MG tablet Take 1 tablet by mouth 2 times daily (with meals). 9/10/14   Paddy Nielsen MD   albuterol (PROVENTIL) (2.5 MG/3ML) 0.083% nebulizer solution Take 2.5 mg by nebulization every 4 hours as needed for Wheezing. Historical Provider, MD   ALBUTEROL SULFATE IN Inhale 2 puffs into the lungs 4 times daily as needed.     Historical Provider, MD         REVIEW OF SYSTEMS Review of Systems   Constitutional:  Negative for chills and fever. HENT:  Negative for congestion and rhinorrhea. Eyes:  Negative for visual disturbance. Respiratory:  Negative for cough and shortness of breath. Cardiovascular:  Negative for chest pain. Gastrointestinal:  Negative for abdominal pain, constipation, diarrhea, nausea and vomiting. Genitourinary:  Negative for dysuria and frequency. Musculoskeletal:  Positive for arthralgias. Negative for back pain and neck pain. Skin:  Negative for rash. Neurological:  Positive for weakness and numbness. Negative for headaches. PHYSICAL EXAM      INITIAL VITALS:   There were no vitals taken for this visit. Physical Exam  Constitutional:       General: He is not in acute distress. Appearance: Normal appearance. He is not ill-appearing, toxic-appearing or diaphoretic. HENT:      Head: Normocephalic and atraumatic. Mouth/Throat:      Mouth: Mucous membranes are moist.      Pharynx: Oropharynx is clear. Eyes:      Extraocular Movements: Extraocular movements intact. Cardiovascular:      Rate and Rhythm: Normal rate and regular rhythm. Heart sounds: Normal heart sounds. No murmur heard. Pulmonary:      Effort: Pulmonary effort is normal. No respiratory distress. Breath sounds: Normal breath sounds. No wheezing or rhonchi. Abdominal:      Palpations: Abdomen is soft. Tenderness: There is no abdominal tenderness. Musculoskeletal:         General: Normal range of motion. Cervical back: Normal range of motion and neck supple. Comments: Patient has difficulty with range of motion of the left arm, is able to minimally flex at the shoulder and minimally flex at the elbow, does have extension and flexion at the wrist intact. Median, radial, ulnar nerve appear to be intact distally. Tenderness palpation of the anterior shoulder diffusely, no bony abnormality.   Able to passively move the arm through range of motion although this appears to be painful for the patient. Skin:     General: Skin is warm and dry. Neurological:      General: No focal deficit present. Mental Status: He is alert and oriented to person, place, and time. Comments: Subjective numbness in the left arm and hand. DDX/DIAGNOSTIC RESULTS / EMERGENCY DEPARTMENT COURSE / MDM     Medical Decision Making  79-year-old male presenting with concerns of stroke symptoms with left arm pain and numbness over the past 2 to 3 days. Patient is in no acute distress, vital stable, he is seen ambulating in the department. Has no facial droop or cranial nerve deficits. Only symptoms are in the left arm and are mostly pain although some subjective sensation changes in addition to weekend grasp and weekend range of motion in all planes. 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 Labs, imaging including x-ray of the left shoulder, CT of the head and CTA of the head and neck ordered. Amount and/or Complexity of Data Reviewed  Labs: ordered. ECG/medicine tests: ordered. EKG      All EKG's are interpreted by the Emergency Department Physician who either signs or Co-signs this chart in the absence of a cardiologist.    EMERGENCY DEPARTMENT COURSE:      ED Course as of 03/13/23 1848   Mon Mar 13, 2023   1517 Patient was evaluated with initial exam performed. He presented with left arm pain and weakness over the last 2 to 3 days. He had tenderness over the anterior shoulder and some pain in the left chest with numbness and the left arm. Reported history of stroke. After initial labs and imaging place, patient was nowhere to be found with nursing staff.   Waited for approximately 30 minutes, patient appears to have eloped from the department. [JS]      ED Course User Index  [JS] Donis Page DO       PROCEDURES:      CONSULTS:  None    FINAL IMPRESSION      1. Eloped from emergency department    2. Left arm pain          DISPOSITION / PLAN     DISPOSITION Eloped - Left Before Treatment Complete 03/13/2023 03:17:31 PM      PATIENT REFERRED TO:  No follow-up provider specified.     DISCHARGE MEDICATIONS:  Discharge Medication List as of 3/13/2023  4:40 PM          Donis Page DO  Emergency Medicine Resident    (Please note that portions of thisnote were completed with a voice recognition program.  Efforts were made to edit the dictations but occasionally words are mis-transcribed.)        Donis Page DO  Resident  03/13/23 3025

## 2025-04-15 ENCOUNTER — HOSPITAL ENCOUNTER (EMERGENCY)
Age: 45
Discharge: HOME | End: 2025-04-15
Attending: STUDENT IN AN ORGANIZED HEALTH CARE EDUCATION/TRAINING PROGRAM

## 2025-04-15 VITALS
OXYGEN SATURATION: 98 % | HEART RATE: 85 BPM | SYSTOLIC BLOOD PRESSURE: 137 MMHG | TEMPERATURE: 97.7 F | DIASTOLIC BLOOD PRESSURE: 94 MMHG | RESPIRATION RATE: 18 BRPM

## 2025-04-15 DIAGNOSIS — S61.215A LACERATION OF LEFT RING FINGER WITHOUT FOREIGN BODY WITHOUT DAMAGE TO NAIL, INITIAL ENCOUNTER: Primary | ICD-10-CM

## 2025-04-15 LAB
CHP ED QC CHECK: YES
GLUCOSE BLD-MCNC: 84 MG/DL
GLUCOSE BLD-MCNC: 84 MG/DL (ref 75–110)

## 2025-04-15 PROCEDURE — 99284 EMERGENCY DEPT VISIT MOD MDM: CPT

## 2025-04-15 PROCEDURE — 82947 ASSAY GLUCOSE BLOOD QUANT: CPT

## 2025-04-15 PROCEDURE — 90715 TDAP VACCINE 7 YRS/> IM: CPT

## 2025-04-15 PROCEDURE — 90471 IMMUNIZATION ADMIN: CPT

## 2025-04-15 PROCEDURE — 12001 RPR S/N/AX/GEN/TRNK 2.5CM/<: CPT

## 2025-04-15 PROCEDURE — 93005 ELECTROCARDIOGRAM TRACING: CPT

## 2025-04-15 PROCEDURE — 6360000002 HC RX W HCPCS

## 2025-04-15 RX ADMIN — TETANUS TOXOID, REDUCED DIPHTHERIA TOXOID AND ACELLULAR PERTUSSIS VACCINE, ADSORBED 0.5 ML: 5; 2.5; 8; 8; 2.5 SUSPENSION INTRAMUSCULAR at 19:40

## 2025-04-15 ASSESSMENT — PAIN SCALES - GENERAL: PAINLEVEL_OUTOF10: 3

## 2025-04-15 NOTE — ED PROVIDER NOTES
[facility] EMERGENCY DEPARTMENT  Emergency Department Encounter  Emergency Medicine Resident     Pt Name:Waqas Trujillo  MRN: [id number]  Birthdate 1980  Date of evaluation: 4/15/25  PCP:  No primary care provider on file.  Note Started: 7:30 PM EDT      CHIEF COMPLAINT       Chief Complaint   Patient presents with    Hand Injury     Left hand, multiple fingers with lacerations from hand saw    Laceration    Headache       HISTORY OF PRESENT ILLNESS  (Location/Symptom, Timing/Onset, Context/Setting, Quality, Duration, Modifying Factors, Severity.)      Waqas Trujillo is a 45 y.o. male no significant pert medical history who presents with left hand laceration that began approximately 3 hours ago.    Patient was working as a [employer], had a saw there was a hands-on dropped on his left ring finger, approximately 1 cm laceration with good margins.    Stated that he felt queasy as well, has a headache, but states that he is not in any significant pain at this time and refused Tylenol or Motrin.    Says that he has minimal pain to the finger, has good opposition both flexion and extension.    Here mainly because he says he feels like he lost approximately 100 mL of blood, however is not tachycardic, has good blood pressure, and is saturating well on room air here.    Not up-to-date on tetanus.  Will give tetanus, irrigation, sutures.    PAST MEDICAL / SURGICAL / SOCIAL / FAMILY HISTORY      has a past medical history of Asthma.       has a past surgical history that includes hernia repair and Testicle removal (Right).      Social History     Socioeconomic History    Marital status: Single     Spouse name: Not on file    Number of children: Not on file    Years of education: Not on file    Highest education level: Not on file   Occupational History    Not on file   Tobacco Use    Smoking status: Every Day    Smokeless tobacco: Never   Substance and Sexual Activity    Alcohol use: Yes    Drug use: No

## 2025-04-15 NOTE — ED PROVIDER NOTES
[facility]     Emergency Department     Faculty Attestation    I performed a history and physical examination of the patient and discussed management with the resident. I have reviewed and agree with the resident’s findings including all diagnostic interpretations, and treatment plans as written at the time of my review. Any areas of disagreement are noted on the chart. I was personally present for the key portions of any procedures. I have documented in the chart those procedures where I was not present during the key portions. For Physician Assistant/ Nurse Practitioner cases/documentation I have personally evaluated this patient and have completed at least one if not all key elements of the E/M (history, physical exam, and MDM). Additional findings are as noted.    PtName: Waqas Trujillo  MRN: [id number]  Birthdate 1980  Date of evaluation: 4/15/25  Note Started: 7:34 PM EDT    Primary Care Physician: No primary care provider on file.    Brief HPI:  Patient is a 45-year-old male presents emergency department with left hand injury.  He reports that he cut his left ring finger with a saw.  He is not up-to-date on tetanus.  He reports that after he cut himself there was lots of bleeding and he started to feel lightheaded.  He denies syncope in the past with visualizing blood.  He reports drinking some alcohol this evening.  Denies other injuries.    Pertinent Physical Exam Findings:  Vitals:    04/15/25 1921   BP: (!) 137/94   Pulse: 85   Resp: 18   Temp: 97.7 °F (36.5 °C)   SpO2: 98%   Appears well, no acute distress.  1 cm laceration over the dorsum of the left fourth finger between the PIP and DIP.  No tendon involvement visualized.  Flexion and extension of PIP and DIP are intact.     Medical Decision Making: Patient is a 45 y.o. male presenting to the emergency department with laceration of the left hand and. The chart was reviewed for pertinent history

## 2025-04-15 NOTE — ED NOTES
Patient to ED c/o injury to left hand 3 hours ago. States was at work and someone dropped a hand saw onto his left hand. States laceration on left ring finger has not stopped bleeding since then. Also c/o headache and \"a little lightheadedness\". Pt endorses alcohol consumption since injury. Denies LOC, does not take blood thinners.    On arrival, patient ambulatory, A+OX4, respirations even and unlabored, speaking in complete sentences.

## 2025-04-16 LAB
EKG ATRIAL RATE: 75 BPM
EKG P AXIS: 79 DEGREES
EKG P-R INTERVAL: 168 MS
EKG Q-T INTERVAL: 396 MS
EKG QRS DURATION: 96 MS
EKG QTC CALCULATION (BAZETT): 442 MS
EKG R AXIS: 70 DEGREES
EKG T AXIS: 74 DEGREES
EKG VENTRICULAR RATE: 75 BPM

## 2025-04-16 NOTE — DISCHARGE INSTRUCTIONS
You are seen here for a laceration on your left ring finger.    We have irrigated it, and is stitched it up.    You must return in 7 days to have sutures removed.  You can come back to the emergency department, or go to any primary care office.    Please return to Emergency Department with any new or worsening symptoms, or symptoms do not improve.  This includes fever and chills, nausea vomiting that prevents from eating, shortness of breath or chest pains, significant swelling, tingling, numbness to the left finger, or if symptoms do not improve.